# Patient Record
Sex: FEMALE | URBAN - METROPOLITAN AREA
[De-identification: names, ages, dates, MRNs, and addresses within clinical notes are randomized per-mention and may not be internally consistent; named-entity substitution may affect disease eponyms.]

---

## 2023-11-01 ENCOUNTER — OFFICE VISIT (OUTPATIENT)
Dept: URBAN - METROPOLITAN AREA CLINIC 98 | Facility: CLINIC | Age: 43
End: 2023-11-01

## 2023-11-17 ENCOUNTER — TELEPHONE ENCOUNTER (OUTPATIENT)
Dept: URBAN - METROPOLITAN AREA CLINIC 98 | Facility: CLINIC | Age: 43
End: 2023-11-17

## 2023-11-17 ENCOUNTER — OFFICE VISIT (OUTPATIENT)
Dept: URBAN - METROPOLITAN AREA CLINIC 98 | Facility: CLINIC | Age: 43
End: 2023-11-17
Payer: OTHER GOVERNMENT

## 2023-11-17 VITALS
SYSTOLIC BLOOD PRESSURE: 176 MMHG | HEIGHT: 69 IN | HEART RATE: 97 BPM | DIASTOLIC BLOOD PRESSURE: 66 MMHG | BODY MASS INDEX: 18.28 KG/M2 | WEIGHT: 123.4 LBS | TEMPERATURE: 97.2 F

## 2023-11-17 DIAGNOSIS — R10.11 ABDOMINAL PAIN, RUQ: ICD-10-CM

## 2023-11-17 DIAGNOSIS — K51.311 ULCERATIVE RECTOSIGMOIDITIS WITH RECTAL BLEEDING: ICD-10-CM

## 2023-11-17 DIAGNOSIS — R19.7 DIARRHEA, UNSPECIFIED TYPE: ICD-10-CM

## 2023-11-17 PROBLEM — 41364008: Status: ACTIVE | Noted: 2023-11-17

## 2023-11-17 PROCEDURE — 99205 OFFICE O/P NEW HI 60 MIN: CPT | Performed by: INTERNAL MEDICINE

## 2023-11-17 RX ORDER — MESALAMINE 1.2 G/1
TAKE 4 TABLETS TABLET, DELAYED RELEASE ORAL ONCE A DAY
Qty: 360 TABLETS | Refills: 3 | OUTPATIENT
Start: 2023-11-17 | End: 2024-11-11

## 2023-11-17 RX ORDER — OMEPRAZOLE 40 MG/1
1 CAPSULE 30 MINUTES BEFORE MORNING MEAL CAPSULE, DELAYED RELEASE ORAL ONCE A DAY
Qty: 90 CAPSULES | Refills: 3 | OUTPATIENT
Start: 2023-11-17

## 2023-11-17 RX ORDER — HYOSCYAMINE SULFATE 0.12 MG/1
1 TABLET UNDER THE TONGUE AND ALLOW TO DISSOLVE AS NEEDED TABLET, ORALLY DISINTEGRATING ORAL THREE TIMES A DAY
Qty: 270 TABLETS | Refills: 3 | OUTPATIENT
Start: 2023-11-17 | End: 2024-11-11

## 2023-11-17 NOTE — PHYSICAL EXAM RECTAL:
normal tone, no external hemorrhoids, no masses palpable, no red blood, Tenderness on ASHLYN, Internal hemorrhoids present

## 2023-11-17 NOTE — HPI-TODAY'S VISIT:
Deanne is a 42 yo female accompanied by Sigifredo Smith, for evaluation of UC.  Dx by Dr. Rothman in [place], [place].  Doing ok right now. On the SCD diet. Has helped alot.  Non-daily pain. No diarrhea. Stools ok, but some constipation. Rectal bleeding more with constipation that other times. Fatigue. Nausea before the SCD diet.  Decided to come and see me. GI there wanted to put him on Rinvoq. [facility] website was source.  Went to GI due to bad RUQ pain. Stool was not right. Constant pain. Bleeding. Syx started in April 2022 and got worse. August, she went to primary MD. Went to ER and had a CT and saw that the pancreas was enlarged, and thought there could be pancreatitis, speculated, and then primary MD - Boris Teixeira sent her to Dr. Rothman. Has seen Dr. Rothman 4 times. He suspected UC. He did colonoscopy on 8/18/22 and UC to 20 cm was seen with inflammatkon, exudate and superficial ulceration. Bx showed chronic inflammation  and cryptitis, microabscesses and ulceration.  Focal atypia secondary to infalmmation.   10mg sessile polyp colod snare - removed.  Sessile serrated polyp.  EGD and bx-- Small bowel bx -- no sign of celiac disease increased lyphocytes in the duodenum  Bio naive. No past prednisone No imm  No past mesalamine. No supp use.  No stool cx. No calprotectin.  Started SCD diet October 2022. It has helped syx by 80%. When traveling to [place], had syx due to "noncompliant diet".  SCD diet is not easy.  Mesalamine No drug allergies. She was told that mesalamine led to kidney failure.  Had multiple UTI's and 5 or more courses of antibiotics before the dx of UC. Needs testing for C diff.  No other medical problems. H/0 basal cell ca. Nervous and blood pressure elevated at doctors office.  Past US and CT - full pance - no true abn - need to see records for review.

## 2023-11-18 LAB
A/G RATIO: 1.8
ALBUMIN: 4.8
ALKALINE PHOSPHATASE: 58
ALT (SGPT): 19
AMYLASE: 69
APPEARANCE: CLEAR
AST (SGOT): 21
BACTERIA: (no result)
BASO (ABSOLUTE): 0
BASOS: 1
BILIRUBIN, TOTAL: 0.6
BILIRUBIN: NEGATIVE
BUN/CREATININE RATIO: 20
BUN: 19
C-REACTIVE PROTEIN, QUANT: <1
CALCIUM: 10.1
CARBON DIOXIDE, TOTAL: 24
CAST TYPE: (no result)
CASTS: (no result)
CHLORIDE: 103
COMMENT: (no result)
CREATININE: 0.93
CRYSTAL TYPE: (no result)
CRYSTALS: (no result)
EGFR: 78
EOS (ABSOLUTE): 0.1
EOS: 2
EPITHELIAL CELLS (NON RENAL): (no result)
EPITHELIAL CELLS (RENAL): (no result)
FERRITIN, SERUM: 28
FOLATE (FOLIC ACID), SERUM: 13.6
GLOBULIN, TOTAL: 2.6
GLUCOSE: 84
GLUCOSE: NEGATIVE
HEMATOCRIT: 44.6
HEMATOLOGY COMMENTS:: (no result)
HEMOGLOBIN: 14.5
IMMATURE CELLS: (no result)
IMMATURE GRANS (ABS): 0
IMMATURE GRANULOCYTES: 0
IRON BIND.CAP.(TIBC): 334
IRON SATURATION: 37
IRON: 125
KETONES: NEGATIVE
LIPASE: 41
LYMPHS (ABSOLUTE): 1.2
LYMPHS: 29
MCH: 29.8
MCHC: 32.5
MCV: 92
MICROSCOPIC EXAMINATION: (no result)
MICROSCOPIC EXAMINATION: (no result)
MONOCYTES(ABSOLUTE): 0.3
MONOCYTES: 8
MUCUS THREADS: (no result)
NEUTROPHILS (ABSOLUTE): 2.4
NEUTROPHILS: 60
NITRITE, URINE: NEGATIVE
NRBC: (no result)
OCCULT BLOOD: NEGATIVE
PH: 6
PLATELETS: 264
POTASSIUM: 5.1
PROTEIN, TOTAL: 7.4
PROTEIN: NEGATIVE
RBC: (no result)
RBC: 4.87
RDW: 11.6
SEDIMENTATION RATE-WESTERGREN: 4
SODIUM: 137
SPECIFIC GRAVITY: 1.02
TRICHOMONAS: (no result)
UIBC: 209
URINE-COLOR: YELLOW
UROBILINOGEN,SEMI-QN: 0.2
VITAMIN B12: 415
VITAMIN D, 25-HYDROXY: 16.9
WBC ESTERASE: NEGATIVE
WBC: (no result)
WBC: 4
YEAST: (no result)

## 2023-11-19 ENCOUNTER — TELEPHONE ENCOUNTER (OUTPATIENT)
Dept: URBAN - METROPOLITAN AREA CLINIC 98 | Facility: CLINIC | Age: 43
End: 2023-11-19

## 2023-11-20 ENCOUNTER — TELEPHONE ENCOUNTER (OUTPATIENT)
Dept: URBAN - METROPOLITAN AREA CLINIC 98 | Facility: CLINIC | Age: 43
End: 2023-11-20

## 2023-11-20 RX ORDER — ERGOCALCIFEROL CAPSULES, 1.25 MG/1
1 CAPSULE CAPSULE ORAL
Qty: 12 CAPSULES | Refills: 3 | OUTPATIENT
Start: 2023-11-20 | End: 2024-11-14

## 2023-12-02 LAB
C DIFFICILE TOXIN GENE NAA: NEGATIVE
C DIFFICILE TOXINS A+B, EIA: NEGATIVE
C DIFFICILE, CYTOTOXIN B: (no result)
CALPROTECTIN, FECAL: 73
CAMPYLOBACTER CULTURE: (no result)
E COLI SHIGA TOXIN EIA: NEGATIVE
LACTOFERRIN, FECAL, QUANT.: 4.02
Lab: (no result)
OVA + PARASITE EXAM: (no result)
SALMONELLA/SHIGELLA SCREEN: (no result)
STOOL CULTURE, YERSINIA ONLY: (no result)

## 2024-01-17 ENCOUNTER — OFFICE VISIT (OUTPATIENT)
Dept: URBAN - METROPOLITAN AREA TELEHEALTH 2 | Facility: TELEHEALTH | Age: 44
End: 2024-01-17

## 2024-01-17 VITALS — WEIGHT: 123.5 LBS | BODY MASS INDEX: 18.29 KG/M2 | HEIGHT: 69 IN

## 2024-01-17 RX ORDER — OMEPRAZOLE 40 MG/1
1 CAPSULE 30 MINUTES BEFORE MORNING MEAL CAPSULE, DELAYED RELEASE ORAL ONCE A DAY
Qty: 90 CAPSULES | Refills: 3 | Status: ACTIVE | COMMUNITY
Start: 2023-11-17

## 2024-01-17 RX ORDER — HYOSCYAMINE SULFATE 0.12 MG/1
1 TABLET UNDER THE TONGUE AND ALLOW TO DISSOLVE AS NEEDED TABLET, ORALLY DISINTEGRATING ORAL THREE TIMES A DAY
Qty: 270 TABLETS | Refills: 3 | Status: ACTIVE | COMMUNITY
Start: 2023-11-17 | End: 2024-11-11

## 2024-01-17 RX ORDER — ERGOCALCIFEROL CAPSULES, 1.25 MG/1
1 CAPSULE CAPSULE ORAL
Qty: 12 CAPSULES | Refills: 3 | Status: ACTIVE | COMMUNITY
Start: 2023-11-20 | End: 2024-11-14

## 2024-01-17 RX ORDER — MESALAMINE 1.2 G/1
TAKE 4 TABLETS TABLET, DELAYED RELEASE ORAL ONCE A DAY
Qty: 360 TABLETS | Refills: 3 | Status: ACTIVE | COMMUNITY
Start: 2023-11-17 | End: 2024-11-11

## 2024-02-07 ENCOUNTER — TELEP (OUTPATIENT)
Dept: URBAN - METROPOLITAN AREA TELEHEALTH 2 | Facility: TELEHEALTH | Age: 44
End: 2024-02-07
Payer: OTHER GOVERNMENT

## 2024-02-07 ENCOUNTER — LAB (OUTPATIENT)
Dept: URBAN - METROPOLITAN AREA TELEHEALTH 2 | Facility: TELEHEALTH | Age: 44
End: 2024-02-07

## 2024-02-07 VITALS — BODY MASS INDEX: 18.28 KG/M2 | HEIGHT: 69 IN | WEIGHT: 123.4 LBS

## 2024-02-07 DIAGNOSIS — K51.311 ULCERATIVE RECTOSIGMOIDITIS WITH RECTAL BLEEDING: ICD-10-CM

## 2024-02-07 DIAGNOSIS — R10.11 ABDOMINAL PAIN, RUQ: ICD-10-CM

## 2024-02-07 DIAGNOSIS — R19.7 ACUTE DIARRHEA: ICD-10-CM

## 2024-02-07 DIAGNOSIS — K62.5 RECTAL BLEEDING: ICD-10-CM

## 2024-02-07 PROCEDURE — 99214 OFFICE O/P EST MOD 30 MIN: CPT | Performed by: INTERNAL MEDICINE

## 2024-02-07 RX ORDER — OMEPRAZOLE 40 MG/1
1 CAPSULE 30 MINUTES BEFORE MORNING MEAL CAPSULE, DELAYED RELEASE ORAL ONCE A DAY
Qty: 90 CAPSULES | Refills: 3 | Status: ACTIVE | COMMUNITY
Start: 2023-11-17

## 2024-02-07 RX ORDER — MESALAMINE 1.2 G/1
TAKE 4 TABLETS TABLET, DELAYED RELEASE ORAL ONCE A DAY
Qty: 360 TABLETS | Refills: 3 | Status: ACTIVE | COMMUNITY
Start: 2023-11-17 | End: 2024-11-11

## 2024-02-07 RX ORDER — ERGOCALCIFEROL CAPSULES, 1.25 MG/1
1 CAPSULE CAPSULE ORAL
Qty: 12 CAPSULES | Refills: 3 | Status: ACTIVE | COMMUNITY
Start: 2023-11-20 | End: 2024-11-14

## 2024-02-07 RX ORDER — HYOSCYAMINE SULFATE 0.12 MG/1
1 TABLET UNDER THE TONGUE AND ALLOW TO DISSOLVE AS NEEDED TABLET, ORALLY DISINTEGRATING ORAL THREE TIMES A DAY
Qty: 270 TABLETS | Refills: 3 | Status: ACTIVE | COMMUNITY
Start: 2023-11-17 | End: 2024-11-11

## 2024-02-07 NOTE — HPI-TODAY'S VISIT:
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 Colonoscopy 2022 showed changes of uUC to 20 mg. Also had a 1 cm serrated polyp in distl colon.  Reviewed labs which overall look great. calpro 73, lacto 4-, nl esr, crp,- low nl cbc and fe, but has low Vit D of 16.9 and so rec Vit D 50,  Has 2-3 stools each am, formed. Is having some blood, non-daily.  Short of an exam, suggest mesalamine or annusol.  She would like to try the mesalamine supp qhs With 6-8 week course, she will likely feel better TH visit in 2-3 months and coordinate colonoscopy after that.  ========================== 11/17/23  Deanne is a 42 yo female accompanied by Sigifredo Smith, for evaluation of UC.  Dx by Dr. Rothman in [place], [place].  Doing ok right now. On the SCD diet. Has helped alot.  Non-daily pain. No diarrhea. Stools ok, but some constipation. Rectal bleeding more with constipation that other times. Fatigue. Nausea before the SCD diet.  Decided to come and see me. GI there wanted to put him on Rinvoq. SCD website was source.  Went to GI due to bad RUQ pain. Stool was not right. Constant pain. Bleeding. Syx started in April 2022 and got worse. August, she went to primary MD. Went to ER and had a CT and saw that the pancreas was enlarged, and thought there could be pancreatitis, speculated, and then primary MD - Boris Teixeira sent her to Dr. Rothman. Has seen Dr. Rothman 4 times. He suspected UC. He did colonoscopy on 8/18/22 and UC to 20 cm was seen with inflammatkon, exudate and superficial ulceration. Bx showed chronic inflammation  and cryptitis, microabscesses and ulceration.  Focal atypia secondary to infalmmation.   10mg sessile polyp colod snare - removed.  Sessile serrated polyp.  EGD and bx-- Small bowel bx -- no sign of celiac disease increased lyphocytes in the duodenum  Bio naive. No past prednisone No imm  No past mesalamine. No supp use.  No stool cx. No calprotectin.  Started SCD diet October 2022. It has helped syx by 80%. When traveling to [place], had syx due to "noncompliant diet".  SCD diet is not easy.  Mesalamine No drug allergies. She was told that mesalamine led to kidney failure.  Had multiple UTI's and 5 or more courses of antibiotics before the dx of UC. Needs testing for C diff.  No other medical problems. H/0 basal cell ca. Nervous and blood pressure elevated at doctors office.  Past US and CT - full pance - no true abn - need to see records for review.

## 2024-07-30 ENCOUNTER — TELEPHONE ENCOUNTER (OUTPATIENT)
Dept: URBAN - METROPOLITAN AREA CLINIC 98 | Facility: CLINIC | Age: 44
End: 2024-07-30

## 2024-07-31 ENCOUNTER — TELEPHONE ENCOUNTER (OUTPATIENT)
Dept: URBAN - METROPOLITAN AREA CLINIC 98 | Facility: CLINIC | Age: 44
End: 2024-07-31

## 2024-07-31 PROBLEM — 235595009: Status: ACTIVE | Noted: 2024-07-31

## 2024-08-08 ENCOUNTER — TELEPHONE ENCOUNTER (OUTPATIENT)
Dept: URBAN - METROPOLITAN AREA CLINIC 98 | Facility: CLINIC | Age: 44
End: 2024-08-08

## 2024-08-08 ENCOUNTER — CLAIMS CREATED FROM THE CLAIM WINDOW (OUTPATIENT)
Dept: URBAN - METROPOLITAN AREA SURGERY CENTER 18 | Facility: SURGERY CENTER | Age: 44
End: 2024-08-08
Payer: COMMERCIAL

## 2024-08-08 ENCOUNTER — CLAIMS CREATED FROM THE CLAIM WINDOW (OUTPATIENT)
Dept: URBAN - METROPOLITAN AREA CLINIC 4 | Facility: CLINIC | Age: 44
End: 2024-08-08
Payer: COMMERCIAL

## 2024-08-08 DIAGNOSIS — K29.81 DUODENITIS WITH BLEEDING: ICD-10-CM

## 2024-08-08 DIAGNOSIS — K31.89 OTHER DISEASES OF STOMACH AND DUODENUM: ICD-10-CM

## 2024-08-08 DIAGNOSIS — K29.80 ACUTE DUODENITIS: ICD-10-CM

## 2024-08-08 DIAGNOSIS — K51.00 ACUTE ULCERATIVE PANCOLITIS: ICD-10-CM

## 2024-08-08 DIAGNOSIS — K21.9 ACID REFLUX: ICD-10-CM

## 2024-08-08 DIAGNOSIS — K21.9 GASTRO-ESOPHAGEAL REFLUX DISEASE WITHOUT ESOPHAGITIS: ICD-10-CM

## 2024-08-08 DIAGNOSIS — K51.80 OTHER ULCERATIVE COLITIS: ICD-10-CM

## 2024-08-08 PROCEDURE — 45380 COLONOSCOPY AND BIOPSY: CPT | Performed by: INTERNAL MEDICINE

## 2024-08-08 PROCEDURE — 88305 TISSUE EXAM BY PATHOLOGIST: CPT | Performed by: PATHOLOGY

## 2024-08-08 PROCEDURE — 00813 ANES UPR LWR GI NDSC PX: CPT | Performed by: REGISTERED NURSE

## 2024-08-08 PROCEDURE — 43239 EGD BIOPSY SINGLE/MULTIPLE: CPT | Performed by: INTERNAL MEDICINE

## 2024-08-08 PROCEDURE — 88313 SPECIAL STAINS GROUP 2: CPT | Performed by: PATHOLOGY

## 2024-08-08 PROCEDURE — 88312 SPECIAL STAINS GROUP 1: CPT | Performed by: PATHOLOGY

## 2024-08-08 RX ORDER — MESALAMINE 1000 MG/1
1 SUPPOSITORY AT BEDTIME SUPPOSITORY RECTAL ONCE A DAY
Qty: 30 SUPPOSITORIES | Refills: 2 | OUTPATIENT
Start: 2024-08-08 | End: 2024-11-05

## 2024-08-08 RX ORDER — MESALAMINE 1.2 G/1
TAKE 4 TABLETS TABLET, DELAYED RELEASE ORAL ONCE A DAY
Qty: 360 TABLETS | Refills: 3 | Status: ACTIVE | COMMUNITY
Start: 2023-11-17 | End: 2024-11-11

## 2024-08-08 RX ORDER — ERGOCALCIFEROL CAPSULES, 1.25 MG/1
1 CAPSULE CAPSULE ORAL
Qty: 12 CAPSULES | Refills: 3 | Status: ACTIVE | COMMUNITY
Start: 2023-11-20 | End: 2024-11-14

## 2024-08-08 RX ORDER — OMEPRAZOLE 40 MG/1
1 CAPSULE 30 MINUTES BEFORE MORNING MEAL CAPSULE, DELAYED RELEASE ORAL ONCE A DAY
Qty: 90 | Refills: 3 | OUTPATIENT
Start: 2024-08-08

## 2024-08-08 RX ORDER — OMEPRAZOLE 40 MG/1
1 CAPSULE 30 MINUTES BEFORE MORNING MEAL CAPSULE, DELAYED RELEASE ORAL ONCE A DAY
Qty: 90 CAPSULES | Refills: 3 | Status: ACTIVE | COMMUNITY
Start: 2023-11-17

## 2024-08-08 RX ORDER — HYOSCYAMINE SULFATE 0.12 MG/1
1 TABLET UNDER THE TONGUE AND ALLOW TO DISSOLVE AS NEEDED TABLET, ORALLY DISINTEGRATING ORAL THREE TIMES A DAY
Qty: 270 TABLETS | Refills: 3 | Status: ACTIVE | COMMUNITY
Start: 2023-11-17 | End: 2024-11-11